# Patient Record
Sex: FEMALE | Race: WHITE
[De-identification: names, ages, dates, MRNs, and addresses within clinical notes are randomized per-mention and may not be internally consistent; named-entity substitution may affect disease eponyms.]

---

## 2020-12-08 ENCOUNTER — HOSPITAL ENCOUNTER (EMERGENCY)
Dept: HOSPITAL 62 - ER | Age: 30
Discharge: HOME | End: 2020-12-08
Payer: MEDICAID

## 2020-12-08 VITALS — DIASTOLIC BLOOD PRESSURE: 70 MMHG | SYSTOLIC BLOOD PRESSURE: 130 MMHG

## 2020-12-08 DIAGNOSIS — R10.9: ICD-10-CM

## 2020-12-08 DIAGNOSIS — F17.200: ICD-10-CM

## 2020-12-08 DIAGNOSIS — R11.10: ICD-10-CM

## 2020-12-08 DIAGNOSIS — L03.311: Primary | ICD-10-CM

## 2020-12-08 DIAGNOSIS — R19.7: ICD-10-CM

## 2020-12-08 PROCEDURE — 96372 THER/PROPH/DIAG INJ SC/IM: CPT

## 2020-12-08 PROCEDURE — 99284 EMERGENCY DEPT VISIT MOD MDM: CPT

## 2020-12-08 NOTE — ER DOCUMENT REPORT
ED Skin Rash/Insect Bite/Abscs





- General


Chief Complaint: Abscess


Stated Complaint: ABSCESS/ABDOMINAL AREA


Time Seen by Provider: 12/08/20 08:24





- HPI


Notes: 





30-year-old female presents to ED for evaluation of possible abscess to the mid 

lower abdomen.  Patient reports was seen at [facility] last night and they 

attempted to drain it however she is not having improvement.  Reports rounding 

erythema.  States that she was started on antibiotics however has not yet picked

them up.  Did receive a dose of 2 last night and believes they are Bactrim and 

Keflex.  Patient states that she is surrounding tenderness.  Denies fever or 

chills.  Denies nausea or vomiting.  Denies a history of MRSA.  Denies any other

complaints at this time.





- Related Data


Allergies/Adverse Reactions: 


                                        





No Known Allergies Allergy (Verified 12/08/20 08:21)


   











Past Medical History





- Social History


Smoking Status: Current Every Day Smoker


Family History: Reviewed & Not Pertinent


Past Surgical History: Reports: Hx Orthopedic Surgery - RT knee





- Immunizations


Hx Diphtheria, Pertussis, Tetanus Vaccination: Yes - Feb 2014





Review of Systems





- Review of Systems


Notes: 


Constitutional: Negative for fever.


HENT: Negative for sore throat.


Eyes: Negative for visual changes.


Cardiovascular: Negative for chest pain.


Respiratory: Negative for shortness of breath.


Gastrointestinal: + for abdominal pain, vomiting or diarrhea.


Genitourinary: Negative for dysuria.


Musculoskeletal: Negative for back pain.


Skin: + for abscess without rash.


Neurological: Negative for headaches, weakness or numbness.





10 point ROS negative except as marked above and in HPI.





Physical Exam





- Vital signs


Vitals: 


                                        











Temp Pulse Resp BP Pulse Ox


 


 98.3 F   60   16   138/74 H  99 


 


 12/08/20 08:16  12/08/20 08:16  12/08/20 08:16  12/08/20 08:16  12/08/20 08:16








General: No acute distress. Alert and oriented x3. Sitting comfortably in a 

stretcher.


Skin: No jaundice, pallor, petechiae, or rashes. Warm and dry. 3 cm circular 

area of duration to lower abdomen distal to the umbilicus.  Minimal centralized 

area of fluctuance.  No drainage.


Heart: Regular rate and rhythm. S1,S2. No murmurs, rubs, or gallops.


Lungs: Clear to auscultation bilaterally. No wheezes, rhonchi, rales. Equal 

chest expansion. No


retractions.


Abdomen: Soft, nontender to palpation, nondistended. Positive bowel sounds in 

all 4 quadrants. No


masses. No CVA tenderness bilaterally.


Back: No midline spinal TTP. No paraspinous muscular TTP.


Neuro: GCS 15. Moving all extremities without discomfort.


Psych: Mood and affect appropriate.





Course





- Re-evaluation


Re-evalutation: 





12/08/20 09:26


30-year-old female presents to ED for evaluation of abscess versus cellulitis of

the lower abdomen.  Was seen at [facility] last night for similar complaints 

and started on 2 antibiotics.  They were unsuccessful with draining the area.  

Patient is concerned that she may need further intervention.  On physical exam, 

patient has area largely concerning for cellulitis.  I do not palpate any 

fluctuance that could be drained at this time.  I did agree to evaluate the area

with bedside ultrasound which was performed by myself.  I see cobblestoning on 

ultrasound versus definitive fluid collection.  I advised patient of these 

findings.  At this time, I do not believe incision and drainage would be 

beneficial.  Patient was advised that she may require follow-up with general 

surgery.  I will give her an additional dose of clindamycin and she can be kept 

on Bactrim and Keflex.  She is advised to use anti-inflammatories for pain 

management and return for any new or worsening symptoms.  We will anu the area 

with a skin pen so she may see any changes outside the defined area.  Advised to

return if the erythema surpasses these markings.  She will otherwise follow-up 

with her primary care physician.  She understands these indications and is 

agreeable with care plan.





- Vital Signs


Vital signs: 


                                        











Temp Pulse Resp BP Pulse Ox


 


 98.3 F   60   16   138/74 H  99 


 


 12/08/20 08:16  12/08/20 08:16  12/08/20 08:16  12/08/20 08:16  12/08/20 08:16














Procedures





- Ultrasound/Bedside


  ** Ultrasound/Bedside


Time completed: 09:15


Ultrasound: Other


Notes: 





12/08/20 09:26


Area was prepped with towels and ultrasound machine was used to evaluate area of

abscess versus cellulitis to mid lower abdomen.  Ultrasound gel was applied and 

I was able to visualize that there is cobblestoning present to the selected area

on ultrasound without definitive area of abscess. Patient advised of findings 

and tolerated procedure well. 





Discharge





- Discharge


Clinical Impression: 


 Abdominal wall cellulitis





Condition: Stable


Disposition: HOME, SELF-CARE


Instructions:  Abscess ([facility]), Cephalexin ([facility])


Additional Instructions: 


Follow-up with primary care for wound check in the next 2 days.  Return for any 

new or worsening symptoms.  Also contact general surgery for further management.


Forms:  Return to Work


Referrals: 


SURGERY [Provider Group] - Follow up as needed

## 2020-12-12 ENCOUNTER — HOSPITAL ENCOUNTER (EMERGENCY)
Dept: HOSPITAL 62 - ER | Age: 30
Discharge: HOME | End: 2020-12-12
Payer: COMMERCIAL

## 2020-12-12 VITALS — SYSTOLIC BLOOD PRESSURE: 124 MMHG | DIASTOLIC BLOOD PRESSURE: 74 MMHG

## 2020-12-12 DIAGNOSIS — L02.211: Primary | ICD-10-CM

## 2020-12-12 PROCEDURE — 99281 EMR DPT VST MAYX REQ PHY/QHP: CPT

## 2020-12-12 NOTE — ER DOCUMENT REPORT
ED Suture/Wound Recheck





- General


Chief Complaint: Wound Recheck


Stated Complaint: RECHECK/ABSCESS, ABDOMINAL


Time Seen by Provider: 12/12/20 11:58





- HPI


Notes: 





30-year-old female presents to ED for evaluation of abscess reevaluation. 

Patient was seen here several days ago and had an abscess to the lower abdomen 

incised and drained. I had seen her several days prior to that and started her 

on antibiotics. Patient reports that she is here for packing to be changed. 

Notes substantial improvement. States that the redness is improving, it feels 

less warm, and she has less drainage. She denies fever or chills. Denies any 

other complaints at this time.











- Related Data


Allergies/Adverse Reactions: 


                                        





No Known Allergies Allergy (Verified 12/08/20 08:21)


   











Past Medical History





- Social History


Smoking Status: Never Smoker


Chew tobacco use (# tins/day): No


Frequency of alcohol use: None


Drug Abuse: None


Family History: Reviewed & Not Pertinent


Past Surgical History: Reports: Hx Orthopedic Surgery - RT knee





- Immunizations


Hx Diphtheria, Pertussis, Tetanus Vaccination: Yes - Feb 2014





Review of Systems





- Review of Systems


Notes: 


Constitutional: Negative for fever.


HENT: Negative for sore throat.


Eyes: Negative for visual changes.


Cardiovascular: Negative for chest pain.


Respiratory: Negative for shortness of breath.


Gastrointestinal: Negative for abdominal pain, vomiting or diarrhea.


Genitourinary: Negative for dysuria.


Musculoskeletal: Negative for back pain.


Skin: Abscess to mid lower abdomen


Neurological: Negative for headaches, weakness or numbness.





10 point ROS negative except as marked above and in HPI.





Physical Exam





- Vital signs


Vitals: 


                                        











Temp Pulse Resp BP Pulse Ox


 


 98.4 F   64   16   124/74   100 


 


 12/12/20 11:19  12/12/20 11:19  12/12/20 11:19  12/12/20 11:19  12/12/20 11:19








General: No acute distress. Alert and oriented x3. Sitting comfortably in a 

stretcher.


Skin: No jaundice, pallor, petechiae, or rashes. Warm and dry.


Heart: Regular rate and rhythm. S1,S2. No murmurs, rubs, or gallops.


Lungs: Clear to auscultation bilaterally. No wheezes, rhonchi, rales. Equal 

chest expansion. No


retractions.


Abdomen: 2 cm area of induration with central open abscess with packing in place

to mid lower abdomen distal to the umbilicus. No tenderness. Soft, nontender to 

palpation, nondistended. Positive bowel sounds in all 4 quadrants. No


masses. No CVA tenderness bilaterally.


Back: No midline spinal TTP. No paraspinous muscular TTP.


Neuro: GCS 15. Moving all extremities without discomfort.


Psych: Mood and affect appropriate.





Course





- Re-evaluation


Re-evalutation: 





12/12/20 13:21


30-year-old female presents to ED for evaluation of wound recheck. Patient had 

incision and drainage of her abscess several days ago. On reevaluation, this 

appears greatly improved. I removed the packing with a small amount of drainage 

present. I changed and replaced the iodoform packing with new packing with 

minimal drainage. Patient notes that this feels improved. She is advised to 

continue her antibiotics and return for any new or worsening symptoms. Patient 

will follow up with her primary care physician as an outpatient. She is advised 

to continue hot packing the area. Understands her course of management and is 

agreeable with care plan.





- Vital Signs


Vital signs: 


                                        











Temp Pulse Resp BP Pulse Ox


 


 98.4 F   64   16   124/74   100 


 


 12/12/20 11:19  12/12/20 11:19  12/12/20 11:19  12/12/20 11:19  12/12/20 11:19














- Laboratory Results


Critical Laboratory Results Reviewed: No Critical Results





- Radiology Results


Critical Radiology Results Reviewed: No Critical Results





Discharge





- Discharge


Clinical Impression: 


 Encounter for wound re-check





Condition: Stable


Disposition: HOME, SELF-CARE


Instructions:  Abscess ([facility]), Post Incision and Drainage


Additional Instructions: 


Apply heat to the area 2-3 times daily.  May soak in a tub with Epson salt.  

Remove packing in 2 days.  Return for any new or worsening symptoms.